# Patient Record
Sex: MALE | Race: WHITE | Employment: OTHER | URBAN - METROPOLITAN AREA
[De-identification: names, ages, dates, MRNs, and addresses within clinical notes are randomized per-mention and may not be internally consistent; named-entity substitution may affect disease eponyms.]

---

## 2022-01-01 ENCOUNTER — HOSPITAL ENCOUNTER (INPATIENT)
Age: 87
LOS: 7 days | DRG: 951 | End: 2022-07-02
Attending: FAMILY MEDICINE | Admitting: FAMILY MEDICINE
Payer: COMMERCIAL

## 2022-01-01 VITALS
SYSTOLIC BLOOD PRESSURE: 134 MMHG | OXYGEN SATURATION: 94 % | TEMPERATURE: 98 F | HEIGHT: 74 IN | RESPIRATION RATE: 18 BRPM | WEIGHT: 189.3 LBS | HEART RATE: 110 BPM | BODY MASS INDEX: 24.29 KG/M2 | DIASTOLIC BLOOD PRESSURE: 76 MMHG

## 2022-01-01 PROCEDURE — 6360000002 HC RX W HCPCS: Performed by: FAMILY MEDICINE

## 2022-01-01 PROCEDURE — 2500000003 HC RX 250 WO HCPCS: Performed by: FAMILY MEDICINE

## 2022-01-01 PROCEDURE — 1200000000 HC SEMI PRIVATE

## 2022-01-01 PROCEDURE — 94761 N-INVAS EAR/PLS OXIMETRY MLT: CPT

## 2022-01-01 PROCEDURE — 6370000000 HC RX 637 (ALT 250 FOR IP): Performed by: FAMILY MEDICINE

## 2022-01-01 PROCEDURE — 51702 INSERT TEMP BLADDER CATH: CPT

## 2022-01-01 RX ORDER — LORAZEPAM 2 MG/ML
0.5 INJECTION INTRAMUSCULAR
Status: DISCONTINUED | OUTPATIENT
Start: 2022-01-01 | End: 2022-01-01

## 2022-01-01 RX ORDER — GLYCOPYRROLATE 1 MG/5 ML
0.2 SYRINGE (ML) INTRAVENOUS EVERY 4 HOURS PRN
Status: DISCONTINUED | OUTPATIENT
Start: 2022-01-01 | End: 2022-01-01 | Stop reason: HOSPADM

## 2022-01-01 RX ORDER — HALOPERIDOL 5 MG/ML
1 INJECTION INTRAMUSCULAR EVERY 6 HOURS SCHEDULED
Status: DISCONTINUED | OUTPATIENT
Start: 2022-01-01 | End: 2022-01-01

## 2022-01-01 RX ORDER — LORAZEPAM 2 MG/ML
0.5 INJECTION INTRAMUSCULAR 3 TIMES DAILY
Status: DISCONTINUED | OUTPATIENT
Start: 2022-01-01 | End: 2022-01-01 | Stop reason: HOSPADM

## 2022-01-01 RX ORDER — LORAZEPAM 2 MG/ML
1 CONCENTRATE ORAL EVERY 8 HOURS SCHEDULED
Status: DISCONTINUED | OUTPATIENT
Start: 2022-01-01 | End: 2022-01-01

## 2022-01-01 RX ORDER — LORAZEPAM 2 MG/ML
0.5 CONCENTRATE ORAL
Status: DISCONTINUED | OUTPATIENT
Start: 2022-01-01 | End: 2022-01-01

## 2022-01-01 RX ORDER — GLYCOPYRROLATE 0.2 MG/ML
0.2 INJECTION INTRAMUSCULAR; INTRAVENOUS EVERY 4 HOURS PRN
Status: DISCONTINUED | OUTPATIENT
Start: 2022-01-01 | End: 2022-01-01 | Stop reason: ALTCHOICE

## 2022-01-01 RX ORDER — LORAZEPAM 2 MG/ML
1 INJECTION INTRAMUSCULAR 3 TIMES DAILY
Status: DISCONTINUED | OUTPATIENT
Start: 2022-01-01 | End: 2022-01-01

## 2022-01-01 RX ORDER — ACETAMINOPHEN 650 MG/1
650 SUPPOSITORY RECTAL EVERY 4 HOURS PRN
Status: DISCONTINUED | OUTPATIENT
Start: 2022-01-01 | End: 2022-01-01 | Stop reason: HOSPADM

## 2022-01-01 RX ORDER — HALOPERIDOL 5 MG/ML
1 INJECTION INTRAMUSCULAR
Status: DISCONTINUED | OUTPATIENT
Start: 2022-01-01 | End: 2022-01-01 | Stop reason: HOSPADM

## 2022-01-01 RX ADMIN — HYDROMORPHONE HYDROCHLORIDE 0.5 MG: 1 INJECTION, SOLUTION INTRAMUSCULAR; INTRAVENOUS; SUBCUTANEOUS at 12:35

## 2022-01-01 RX ADMIN — Medication 1 MG: at 05:15

## 2022-01-01 RX ADMIN — HYDROMORPHONE HYDROCHLORIDE 0.5 MG: 1 INJECTION, SOLUTION INTRAMUSCULAR; INTRAVENOUS; SUBCUTANEOUS at 16:28

## 2022-01-01 RX ADMIN — HALOPERIDOL LACTATE 1 MG: 5 INJECTION, SOLUTION INTRAMUSCULAR at 19:42

## 2022-01-01 RX ADMIN — HALOPERIDOL LACTATE 1 MG: 5 INJECTION, SOLUTION INTRAMUSCULAR at 12:10

## 2022-01-01 RX ADMIN — HYDROMORPHONE HYDROCHLORIDE 0.5 MG: 1 INJECTION, SOLUTION INTRAMUSCULAR; INTRAVENOUS; SUBCUTANEOUS at 23:36

## 2022-01-01 RX ADMIN — HYDROMORPHONE HYDROCHLORIDE 0.5 MG: 1 INJECTION, SOLUTION INTRAMUSCULAR; INTRAVENOUS; SUBCUTANEOUS at 12:17

## 2022-01-01 RX ADMIN — HYDROMORPHONE HYDROCHLORIDE 0.5 MG: 1 INJECTION, SOLUTION INTRAMUSCULAR; INTRAVENOUS; SUBCUTANEOUS at 05:16

## 2022-01-01 RX ADMIN — HALOPERIDOL LACTATE 1 MG: 5 INJECTION, SOLUTION INTRAMUSCULAR at 05:10

## 2022-01-01 RX ADMIN — HALOPERIDOL LACTATE 1 MG: 5 INJECTION, SOLUTION INTRAMUSCULAR at 09:09

## 2022-01-01 RX ADMIN — HYDROMORPHONE HYDROCHLORIDE 0.5 MG: 1 INJECTION, SOLUTION INTRAMUSCULAR; INTRAVENOUS; SUBCUTANEOUS at 09:58

## 2022-01-01 RX ADMIN — Medication 1 MG: at 09:07

## 2022-01-01 RX ADMIN — HYDROMORPHONE HYDROCHLORIDE 0.5 MG: 1 INJECTION, SOLUTION INTRAMUSCULAR; INTRAVENOUS; SUBCUTANEOUS at 20:02

## 2022-01-01 RX ADMIN — HYDROMORPHONE HYDROCHLORIDE 0.5 MG: 1 INJECTION, SOLUTION INTRAMUSCULAR; INTRAVENOUS; SUBCUTANEOUS at 16:41

## 2022-01-01 RX ADMIN — HALOPERIDOL LACTATE 1 MG: 5 INJECTION, SOLUTION INTRAMUSCULAR at 12:16

## 2022-01-01 RX ADMIN — HALOPERIDOL LACTATE 1 MG: 5 INJECTION, SOLUTION INTRAMUSCULAR at 00:16

## 2022-01-01 RX ADMIN — HALOPERIDOL LACTATE 1 MG: 5 INJECTION, SOLUTION INTRAMUSCULAR at 23:42

## 2022-01-01 RX ADMIN — Medication 1 MG: at 15:01

## 2022-01-01 RX ADMIN — HYDROMORPHONE HYDROCHLORIDE 0.5 MG: 1 INJECTION, SOLUTION INTRAMUSCULAR; INTRAVENOUS; SUBCUTANEOUS at 12:12

## 2022-01-01 RX ADMIN — HYDROMORPHONE HYDROCHLORIDE 0.5 MG: 1 INJECTION, SOLUTION INTRAMUSCULAR; INTRAVENOUS; SUBCUTANEOUS at 05:10

## 2022-01-01 RX ADMIN — Medication 1 MG: at 22:38

## 2022-01-01 RX ADMIN — HALOPERIDOL LACTATE 1 MG: 5 INJECTION, SOLUTION INTRAMUSCULAR at 00:51

## 2022-01-01 RX ADMIN — HALOPERIDOL LACTATE 1 MG: 5 INJECTION, SOLUTION INTRAMUSCULAR at 23:07

## 2022-01-01 RX ADMIN — HALOPERIDOL LACTATE 1 MG: 5 INJECTION, SOLUTION INTRAMUSCULAR at 15:27

## 2022-01-01 RX ADMIN — HYDROMORPHONE HYDROCHLORIDE 0.5 MG: 1 INJECTION, SOLUTION INTRAMUSCULAR; INTRAVENOUS; SUBCUTANEOUS at 23:57

## 2022-01-01 RX ADMIN — LORAZEPAM 0.5 MG: 2 INJECTION INTRAMUSCULAR; INTRAVENOUS at 18:36

## 2022-01-01 RX ADMIN — HYDROMORPHONE HYDROCHLORIDE 0.5 MG: 1 INJECTION, SOLUTION INTRAMUSCULAR; INTRAVENOUS; SUBCUTANEOUS at 12:52

## 2022-01-01 RX ADMIN — HYDROMORPHONE HYDROCHLORIDE 0.5 MG: 1 INJECTION, SOLUTION INTRAMUSCULAR; INTRAVENOUS; SUBCUTANEOUS at 05:14

## 2022-01-01 RX ADMIN — HYDROMORPHONE HYDROCHLORIDE 0.5 MG: 1 INJECTION, SOLUTION INTRAMUSCULAR; INTRAVENOUS; SUBCUTANEOUS at 11:44

## 2022-01-01 RX ADMIN — HALOPERIDOL LACTATE 1 MG: 5 INJECTION, SOLUTION INTRAMUSCULAR at 00:10

## 2022-01-01 RX ADMIN — HYDROMORPHONE HYDROCHLORIDE 0.5 MG: 1 INJECTION, SOLUTION INTRAMUSCULAR; INTRAVENOUS; SUBCUTANEOUS at 08:24

## 2022-01-01 RX ADMIN — HYDROMORPHONE HYDROCHLORIDE 0.5 MG: 1 INJECTION, SOLUTION INTRAMUSCULAR; INTRAVENOUS; SUBCUTANEOUS at 04:19

## 2022-01-01 RX ADMIN — HALOPERIDOL LACTATE 1 MG: 5 INJECTION, SOLUTION INTRAMUSCULAR at 20:15

## 2022-01-01 RX ADMIN — HYDROMORPHONE HYDROCHLORIDE 0.5 MG: 1 INJECTION, SOLUTION INTRAMUSCULAR; INTRAVENOUS; SUBCUTANEOUS at 20:14

## 2022-01-01 RX ADMIN — HYDROMORPHONE HYDROCHLORIDE 0.5 MG: 1 INJECTION, SOLUTION INTRAMUSCULAR; INTRAVENOUS; SUBCUTANEOUS at 23:07

## 2022-01-01 RX ADMIN — HALOPERIDOL LACTATE 1 MG: 5 INJECTION, SOLUTION INTRAMUSCULAR at 23:58

## 2022-01-01 RX ADMIN — LORAZEPAM 0.5 MG: 2 INJECTION, SOLUTION INTRAMUSCULAR; INTRAVENOUS at 14:11

## 2022-01-01 RX ADMIN — HALOPERIDOL LACTATE 1 MG: 5 INJECTION, SOLUTION INTRAMUSCULAR at 20:02

## 2022-01-01 RX ADMIN — HYDROMORPHONE HYDROCHLORIDE 0.5 MG: 1 INJECTION, SOLUTION INTRAMUSCULAR; INTRAVENOUS; SUBCUTANEOUS at 08:15

## 2022-01-01 RX ADMIN — HALOPERIDOL LACTATE 1 MG: 5 INJECTION, SOLUTION INTRAMUSCULAR at 06:06

## 2022-01-01 RX ADMIN — HYDROMORPHONE HYDROCHLORIDE 0.5 MG: 1 INJECTION, SOLUTION INTRAMUSCULAR; INTRAVENOUS; SUBCUTANEOUS at 03:52

## 2022-01-01 RX ADMIN — HALOPERIDOL LACTATE 1 MG: 5 INJECTION, SOLUTION INTRAMUSCULAR at 11:44

## 2022-01-01 RX ADMIN — HYDROMORPHONE HYDROCHLORIDE 0.5 MG: 1 INJECTION, SOLUTION INTRAMUSCULAR; INTRAVENOUS; SUBCUTANEOUS at 08:37

## 2022-01-01 RX ADMIN — HALOPERIDOL LACTATE 1 MG: 5 INJECTION, SOLUTION INTRAMUSCULAR at 16:40

## 2022-01-01 RX ADMIN — HALOPERIDOL LACTATE 1 MG: 5 INJECTION, SOLUTION INTRAMUSCULAR at 18:09

## 2022-01-01 RX ADMIN — HYDROMORPHONE HYDROCHLORIDE 0.5 MG: 1 INJECTION, SOLUTION INTRAMUSCULAR; INTRAVENOUS; SUBCUTANEOUS at 00:51

## 2022-01-01 RX ADMIN — HYDROMORPHONE HYDROCHLORIDE 0.5 MG: 1 INJECTION, SOLUTION INTRAMUSCULAR; INTRAVENOUS; SUBCUTANEOUS at 07:50

## 2022-01-01 RX ADMIN — HYDROMORPHONE HYDROCHLORIDE 0.5 MG: 1 INJECTION, SOLUTION INTRAMUSCULAR; INTRAVENOUS; SUBCUTANEOUS at 13:07

## 2022-01-01 RX ADMIN — HALOPERIDOL LACTATE 1 MG: 5 INJECTION, SOLUTION INTRAMUSCULAR at 04:40

## 2022-01-01 RX ADMIN — HYDROMORPHONE HYDROCHLORIDE 1 MG: 1 INJECTION, SOLUTION INTRAMUSCULAR; INTRAVENOUS; SUBCUTANEOUS at 22:03

## 2022-01-01 RX ADMIN — LORAZEPAM 0.5 MG: 2 INJECTION, SOLUTION INTRAMUSCULAR; INTRAVENOUS at 14:55

## 2022-01-01 RX ADMIN — LORAZEPAM 0.5 MG: 2 INJECTION INTRAMUSCULAR; INTRAVENOUS at 00:52

## 2022-01-01 RX ADMIN — HYDROMORPHONE HYDROCHLORIDE 0.5 MG: 1 INJECTION, SOLUTION INTRAMUSCULAR; INTRAVENOUS; SUBCUTANEOUS at 17:09

## 2022-01-01 RX ADMIN — HALOPERIDOL LACTATE 1 MG: 5 INJECTION, SOLUTION INTRAMUSCULAR at 05:45

## 2022-01-01 RX ADMIN — HYDROMORPHONE HYDROCHLORIDE 1 MG: 1 INJECTION, SOLUTION INTRAMUSCULAR; INTRAVENOUS; SUBCUTANEOUS at 02:12

## 2022-01-01 RX ADMIN — HALOPERIDOL LACTATE 1 MG: 5 INJECTION, SOLUTION INTRAMUSCULAR at 16:41

## 2022-01-01 RX ADMIN — LORAZEPAM 0.5 MG: 2 INJECTION, SOLUTION INTRAMUSCULAR; INTRAVENOUS at 23:08

## 2022-01-01 RX ADMIN — HYDROMORPHONE HYDROCHLORIDE 0.5 MG: 1 INJECTION, SOLUTION INTRAMUSCULAR; INTRAVENOUS; SUBCUTANEOUS at 19:41

## 2022-01-01 RX ADMIN — HALOPERIDOL LACTATE 1 MG: 5 INJECTION, SOLUTION INTRAMUSCULAR at 08:14

## 2022-01-01 RX ADMIN — HALOPERIDOL LACTATE 1 MG: 5 INJECTION, SOLUTION INTRAMUSCULAR at 04:19

## 2022-01-01 RX ADMIN — HALOPERIDOL LACTATE 1 MG: 5 INJECTION, SOLUTION INTRAMUSCULAR at 21:44

## 2022-01-01 RX ADMIN — GLYCOPYRROLATE 0.2 MG: 1 INJECTION INTRAMUSCULAR; INTRAVENOUS at 06:48

## 2022-01-01 RX ADMIN — HYDROMORPHONE HYDROCHLORIDE 0.5 MG: 1 INJECTION, SOLUTION INTRAMUSCULAR; INTRAVENOUS; SUBCUTANEOUS at 16:40

## 2022-01-01 RX ADMIN — HYDROMORPHONE HYDROCHLORIDE 0.5 MG: 1 INJECTION, SOLUTION INTRAMUSCULAR; INTRAVENOUS; SUBCUTANEOUS at 00:52

## 2022-01-01 RX ADMIN — HALOPERIDOL LACTATE 1 MG: 5 INJECTION, SOLUTION INTRAMUSCULAR at 16:58

## 2022-01-01 RX ADMIN — HALOPERIDOL LACTATE 1 MG: 5 INJECTION, SOLUTION INTRAMUSCULAR at 05:14

## 2022-01-01 RX ADMIN — HALOPERIDOL LACTATE 1 MG: 5 INJECTION, SOLUTION INTRAMUSCULAR at 08:44

## 2022-01-01 RX ADMIN — LORAZEPAM 0.5 MG: 2 INJECTION, SOLUTION INTRAMUSCULAR; INTRAVENOUS at 09:09

## 2022-01-01 RX ADMIN — HYDROMORPHONE HYDROCHLORIDE 0.5 MG: 1 INJECTION, SOLUTION INTRAMUSCULAR; INTRAVENOUS; SUBCUTANEOUS at 04:40

## 2022-01-01 RX ADMIN — HYDROMORPHONE HYDROCHLORIDE 0.5 MG: 1 INJECTION, SOLUTION INTRAMUSCULAR; INTRAVENOUS; SUBCUTANEOUS at 00:08

## 2022-01-01 RX ADMIN — HYDROMORPHONE HYDROCHLORIDE 0.5 MG: 1 INJECTION, SOLUTION INTRAMUSCULAR; INTRAVENOUS; SUBCUTANEOUS at 16:31

## 2022-01-01 RX ADMIN — HYDROMORPHONE HYDROCHLORIDE 0.5 MG: 1 INJECTION, SOLUTION INTRAMUSCULAR; INTRAVENOUS; SUBCUTANEOUS at 04:06

## 2022-01-01 RX ADMIN — HYDROMORPHONE HYDROCHLORIDE 1 MG: 1 INJECTION, SOLUTION INTRAMUSCULAR; INTRAVENOUS; SUBCUTANEOUS at 14:07

## 2022-01-01 RX ADMIN — Medication 0.5 MG: at 13:13

## 2022-01-01 RX ADMIN — HYDROMORPHONE HYDROCHLORIDE 0.5 MG: 1 INJECTION, SOLUTION INTRAMUSCULAR; INTRAVENOUS; SUBCUTANEOUS at 08:41

## 2022-01-01 RX ADMIN — LORAZEPAM 0.5 MG: 2 INJECTION, SOLUTION INTRAMUSCULAR; INTRAVENOUS at 08:14

## 2022-01-01 RX ADMIN — HALOPERIDOL LACTATE 1 MG: 5 INJECTION, SOLUTION INTRAMUSCULAR at 03:51

## 2022-01-01 RX ADMIN — HALOPERIDOL LACTATE 1 MG: 5 INJECTION, SOLUTION INTRAMUSCULAR at 12:35

## 2022-01-01 RX ADMIN — HYDROMORPHONE HYDROCHLORIDE 0.5 MG: 1 INJECTION, SOLUTION INTRAMUSCULAR; INTRAVENOUS; SUBCUTANEOUS at 16:59

## 2022-01-01 RX ADMIN — LORAZEPAM 0.5 MG: 2 INJECTION INTRAMUSCULAR; INTRAVENOUS at 10:33

## 2022-01-01 RX ADMIN — HYDROMORPHONE HYDROCHLORIDE 0.5 MG: 1 INJECTION, SOLUTION INTRAMUSCULAR; INTRAVENOUS; SUBCUTANEOUS at 00:15

## 2022-01-01 RX ADMIN — HALOPERIDOL LACTATE 1 MG: 5 INJECTION, SOLUTION INTRAMUSCULAR at 16:31

## 2022-01-01 RX ADMIN — HYDROMORPHONE HYDROCHLORIDE 0.5 MG: 1 INJECTION, SOLUTION INTRAMUSCULAR; INTRAVENOUS; SUBCUTANEOUS at 21:44

## 2022-01-01 RX ADMIN — HALOPERIDOL LACTATE 1 MG: 5 INJECTION, SOLUTION INTRAMUSCULAR at 14:07

## 2022-01-01 RX ADMIN — HALOPERIDOL LACTATE 1 MG: 5 INJECTION, SOLUTION INTRAMUSCULAR at 12:53

## 2022-01-01 RX ADMIN — HALOPERIDOL LACTATE 1 MG: 5 INJECTION, SOLUTION INTRAMUSCULAR at 00:52

## 2022-01-01 ASSESSMENT — PAIN SCALES - WONG BAKER
WONGBAKER_NUMERICALRESPONSE: 0
WONGBAKER_NUMERICALRESPONSE: 4
WONGBAKER_NUMERICALRESPONSE: 2
WONGBAKER_NUMERICALRESPONSE: 0
WONGBAKER_NUMERICALRESPONSE: 2
WONGBAKER_NUMERICALRESPONSE: 4
WONGBAKER_NUMERICALRESPONSE: 0
WONGBAKER_NUMERICALRESPONSE: 8
WONGBAKER_NUMERICALRESPONSE: 0
WONGBAKER_NUMERICALRESPONSE: 2
WONGBAKER_NUMERICALRESPONSE: 0
WONGBAKER_NUMERICALRESPONSE: 2
WONGBAKER_NUMERICALRESPONSE: 0
WONGBAKER_NUMERICALRESPONSE: 4
WONGBAKER_NUMERICALRESPONSE: 2
WONGBAKER_NUMERICALRESPONSE: 0
WONGBAKER_NUMERICALRESPONSE: 8
WONGBAKER_NUMERICALRESPONSE: 0
WONGBAKER_NUMERICALRESPONSE: 0
WONGBAKER_NUMERICALRESPONSE: 8
WONGBAKER_NUMERICALRESPONSE: 0
WONGBAKER_NUMERICALRESPONSE: 8
WONGBAKER_NUMERICALRESPONSE: 0
WONGBAKER_NUMERICALRESPONSE: 4
WONGBAKER_NUMERICALRESPONSE: 10
WONGBAKER_NUMERICALRESPONSE: 0
WONGBAKER_NUMERICALRESPONSE: 2
WONGBAKER_NUMERICALRESPONSE: 0
WONGBAKER_NUMERICALRESPONSE: 4
WONGBAKER_NUMERICALRESPONSE: 8
WONGBAKER_NUMERICALRESPONSE: 0
WONGBAKER_NUMERICALRESPONSE: 4
WONGBAKER_NUMERICALRESPONSE: 0
WONGBAKER_NUMERICALRESPONSE: 2
WONGBAKER_NUMERICALRESPONSE: 0
WONGBAKER_NUMERICALRESPONSE: 0
WONGBAKER_NUMERICALRESPONSE: 4
WONGBAKER_NUMERICALRESPONSE: 0
WONGBAKER_NUMERICALRESPONSE: 0
WONGBAKER_NUMERICALRESPONSE: 6
WONGBAKER_NUMERICALRESPONSE: 8

## 2022-01-01 ASSESSMENT — PAIN - FUNCTIONAL ASSESSMENT
PAIN_FUNCTIONAL_ASSESSMENT: ACTIVITIES ARE NOT PREVENTED

## 2022-01-01 ASSESSMENT — PAIN DESCRIPTION - ORIENTATION
ORIENTATION: RIGHT

## 2022-01-01 ASSESSMENT — PAIN DESCRIPTION - PAIN TYPE
TYPE: ACUTE PAIN

## 2022-01-01 ASSESSMENT — PAIN DESCRIPTION - DESCRIPTORS
DESCRIPTORS: PATIENT UNABLE TO DESCRIBE
DESCRIPTORS: ACHING
DESCRIPTORS: PATIENT UNABLE TO DESCRIBE
DESCRIPTORS: PATIENT UNABLE TO DESCRIBE
DESCRIPTORS: ACHING

## 2022-01-01 ASSESSMENT — PAIN DESCRIPTION - LOCATION
LOCATION: SHOULDER

## 2022-01-01 ASSESSMENT — PAIN SCALES - GENERAL
PAINLEVEL_OUTOF10: 10
PAINLEVEL_OUTOF10: 7
PAINLEVEL_OUTOF10: 4

## 2022-06-25 PROBLEM — A41.9 SEPSIS (HCC): Status: ACTIVE | Noted: 2022-01-01

## 2022-06-26 PROBLEM — J96.20 ACUTE AND CHRONIC RESPIRATORY FAILURE, UNSPECIFIED WHETHER WITH HYPOXIA OR HYPERCAPNIA (HCC): Status: ACTIVE | Noted: 2022-01-01

## 2022-06-26 NOTE — PROGRESS NOTES
4 Eyes Skin Assessment     NAME:  Maria Victoria Negrete. YOB: 1928  MEDICAL RECORD NUMBER:  [id number]    The patient is being assess for  Admission    I agree that 2 RN's have performed a thorough Head to Toe Skin Assessment on the patient. ALL assessment sites listed below have been assessed. Areas assessed by both nurses:    Head, Face, Ears, Shoulders, Back, Chest, Arms, Elbows, Hands, Sacrum. Buttock, Coccyx, Ischium and Legs. Feet and Heels        Does the Patient have a Wound? Yes wound(s) were present on assessment.  LDA wound assessment was Initiated and completed        Hector Prevention initiated:  Yes   Wound Care Orders initiated:  NA    Pressure Injury (Stage 3,4, Unstageable, DTI, NWPT, and Complex wounds) if present place consult order under [de-identified] No     New and Established Ostomies if present place consult order under : No      Nurse 1 eSignature: Electronically signed by Emerson Randhawa RN on 6/25/22 at 10:58 PM EDT    **SHARE this note so that the co-signing nurse is able to place an eSignature**    Nurse 2 eSignature: Electronically signed by Paola Perkins RN on 6/25/22 at 11:48 PM EDT

## 2022-06-26 NOTE — H&P
[address] [place] [address]  General Inpatient History and Physical      Date: 6/26/2022  Name: Umm Dunaway. MRN: [id number]  YOB: 1928  Admit Date: 6/25/2022  Primary Care: No primary care provider on file. Informant: old records are reviewed. There are no family here. Records are obtained from recent stay at [facility] and reviewed    Chief Complaint:  Unable to self report    History of Present Illness: Umm Castorena is a 80 y.o. male, who is admitted to [address] [place] [address] at [facility]  for management of pain and agitation . Background:      81 yo male transferred to [facility] from [facility] [place] for [place] of life care. The patient was admitted at [facility] from an ECF  with sepsis, developed respiratory failure and was intubated and ventilated. He was doing very poorly and he was extubated 6/17/2022 with very poor prognosis. He was on vasopressors which have been stopped . Family requested transfer so that they could be closer to home. 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 However, she is not here currently. History reviewed. No pertinent past medical history. Past Surgical History   has no past surgical history on file.     Social History:   Social History     Socioeconomic History    Marital status: Single     Spouse name: None    Number of children: None    Years of education: None    Highest education level: None   Occupational History    None   Tobacco Use    Smoking status: Never Smoker    Smokeless tobacco: Never Used   Vaping Use    Vaping Use: Never used   Substance and Sexual Activity    Alcohol use: Not Currently     Alcohol/week: 7.0 standard drinks     Types: 7 Glasses of wine per week    Drug use: Never    Sexual activity: None   Other Topics Concern    None   Social History Narrative    None 91%   BMI 24.30 kg/m²   General: appears to be sleeping but arouses easily to voice and startles, does not make eye contact, when asked about pain he holds his hand up ti the left ear area  HEENT: Mouth is open , mm are dry, eyes are closed, but opens them to voice and sclera is clear; thickening and tenderness in the left parotid area    Heart: distant  ,RRR, S1S2, no murmurs noted  Lungs:  Distant,bilaterally, diminished at the bases  Abdomen[de-identified] no BS ausculted, very full,  but soft ,  no tenderness  Extremities:  Warm , deconditioned, + edema LE, tears and bruises noted   Neurologic: lethargic, arouses to voice, does not respond to questions verbally but makes gestures acknowledging understanding of my questions        Data: reviewed     Assesment/Plan:    1. 79 yo male transferred to [facility] from [facility] in [place] for end of life care. The patient was admitted at [facility] with sepsis, developed respiratory failure and was intubated and ventilated. He was doing very poorly and he was extubated 6/17/2022 with very poor prognosis. He was on vasopressors which have been stopped . Family requested transfer so that they could be closer to home   2. Patient has had 4 doses of hydromorphone in 24 hours and appears painful but may not be able to self report- will schedule RTC hdromorphone and increase breakthrough pain medication dosing   3. The patient is admitted to [facility] for acute management of  Pain, respiratory failure, heart failure, end of life care   4.  DVT prophylaxis: none indicated due to Hospice/end of life care    Patient Active Problem List   Diagnosis Code    Sepsis ([facility] A41.9    Acute and chronic respiratory failure, unspecified whether with hypoxia or hypercapnia ([facility] J96.20        Electronically signed by Ash Raymond DO on 6/26/2022 at 1:42 PM

## 2022-06-27 PROBLEM — Z51.5 HOSPICE CARE: Status: ACTIVE | Noted: 2022-01-01

## 2022-06-27 NOTE — PROGRESS NOTES
[address] [place] [address]  General Inpatient Hospice Progress Note    Date: 6/27/2022  Name: Dash Ballard. MRN: [id number]  YOB: 1928   Patient's PCP: No primary care provider on file. Acute care admission at [facility]: 6/11 to 6/25/22  Mechanical ventilation: 6/11 to 6/15 and emergently re intubated and extubated 6/17/22  Admit Date: 6/25/2022 to General Inpatient Hospice      Subjective: The patient is minimally responsive, open mouth breathing, restless with exam and facial grimacing. The prn dose of Haloperidol was increased yesterday. He is chronically ill appearing. There are no family here. [facility] on 6/27/22. Objective:   Background: 79 yo male with history of dementia, bioprosthetic aortic valve, atrial fibrillation, hypertension, who was transferred to [place] [address] from [facility] in [place] for end of life care. The patient was admitted at [facility] from an ECF with sepsis, suspected left parotitis, E.coli UTI, developed respiratory failure and was intubated and ventilated, + MSSA bacteremia. He was doing very poorly and he was extubated 6/17/2022 with very poor prognosis. He was on vasopressors which have been stopped 6/24 after goals of care meeting with family relating poor prognosis. His family reports declining health in the past several months. Family requested hospice care and transfer to [facility] to be closer to home. The patient has a history of chronic mastoiditis. he patient's primary caregiver is a granddaughter, Matilde Brown. Pain is managed with Hydromorphone IV 0.5 mg scheduled every 6 hours plus prn with 5 prn doses in the past 24 hours, Glycopyrrolate IV is available for secretions, and Lorazepam is available for anxiety. Data from [facility] reviewed 6/27/2022:  CT Neck 6/11/22:  1.  Asymmetric enlargement the left parotid gland, consider parotitis. 2.  Infiltrative changes superficial and deep to the distal muscle and in the facial subcutaneous soft tissues on the left may be due to cellulitis. 3.  Area of hypoattenuation in the left auricular soft tissues measuring 0.7 cm without loculation. May be due to phlegmon. CT-scan of the brain 6/11/22:  1.  No acute intracranial bleed, midline shift, mass effect or extra-axial collection. 2.  Loss of volume in the cerebrum and cerebellum with chronic microvascular changes.     3.  Limited views of the left parotid gland with asymmetric enlargement and some infiltrative changes of the imaged portions of the left facial subcutaneous soft tissues. May be due to parotitis. 4.  Left mastoid airspace disease. CT Chest 6/11/22:  1.  Dependent consolidative changes and few parenchymal bands in the lungs may reflect atelectasis. 2.  Endotracheal tube terminates above the bari. Orogastric tube terminates in the stomach lumen. 3.  Nonspecific fluid-filled segments of small bowel and large bowel without dilatation could be due to diarrhea or a mild enterocolitis. 4.  Ancillary findings discussed above. Transthoracic Echocardiogram 6/13/22:  1. Left ventricle: The cavity size was at the lower limits of      normal. There was mild concentric hypertrophy. Systolic function      was hyperdynamic. The estimated ejection fraction was in the      range of 65% to 70%. Wall motion was normal; there were no      regional wall motion abnormalities. 2. Aortic valve: A bioprosthesis (#23 pericardial bioprosthetic      valve implanted December 2013) was present. 3. Mitral valve: The annulus was moderately to markedly calcified.      The leaflets were moderately thickened. 4. Left atrium: The atrium was moderately dilated. 5. Right ventricle:  The cavity size was normal. Wall thickness was      normal. Systolic function was normal.   6. Right atrium: The atrium was moderately dilated. 7. Pulmonary arteries: Systolic pressure was mildly increased.      Estimated PA peak pressure is 43mm Hg (S). 8. Pericardium, extracardiac: There was no pericardial effusion. Impressions:  Suboptimal exam for excluding vegetations. Valve   structures are not well visualized. Consider CHITO if clinically   indicated. Physical Exam:   BP (!) 114/45   Pulse (!) 141   Temp 97.7 °F (36.5 °C) (Axillary)   Resp 20   Ht 6' 2\" (1.88 m)   Wt 189 lb 4.8 oz (85.9 kg)   SpO2 91%   BMI 24.30 kg/m²   General: restless with exam, + facial grimacing and soft moan with exam, no speech,   Skin: sallow, decreased subcutaneous fat  Neck: carotid upstrokes are diminished without bruit  HEENT: Mucous membranes are dry, open mouth breathing, sclerae are clear, there is some fullness and induration in the left upper neck and submandibular area  Neck: carotid upstrokes are diminished without bruit  Chest: healed sternotomy scar   Heart: distant tones, tachycardic IRRR, S1S2, soft II/VI KIKI at left sternal border  Lungs:  Distant breath sounds bilaterally, diminished at the bases, without rales, scattered rhonchi   Abdomen: soft, bowel sounds hypoactive, no apparent tenderness, nondistended  : undergarment in place  Extremities:  No mottling, + trace edema in bilateral lower extremities,   Neurologic: lethargic, moans with exam, no speech, moves extremities spontaneously but not to command    Assessment/Plan:     1. Sepsis, MSSA bacteremia from left parotitis with septic shock and respiratory failure and removal of mechanical ventilation 6/17, and vasopressors 6/24/22. The patient did not improve with aggressive medical care. [address] for management of pain, dyspnea, delirium, probable end of life care. PPS 20% and prognosis is grim. Adjust scheduled Hydromorphone to every 4 hours and continue higher dose of prn.  Adjust scheduled Haloperidol to every 4 hours and continue prn. Add scheduled Lorazepam.  2. History of bioprosthetic aortic valve  3. Atrial fibrillation  4. DNR-comfort care      Patient Active Problem List   Diagnosis Code    Sepsis ([facility] A41.9    Acute and chronic respiratory failure, unspecified whether with hypoxia or hypercapnia ([facility] J96.20    Hospice care Z51.5       STELLA Welch MD  6/27/2022

## 2022-06-27 NOTE — PROGRESS NOTES
Nutrition Note    Patient screened positive for unintended weight loss and decreased appetite. Patient has been admitted to [facility] on 6/25. Currently on No diet orders on file. May add supplement as pt/family request. Nutrition will have little or no benefit for overall prognosis but provides energy. RD will be available as requested/consulted.      Electronically signed by Levy Abel RD, JUSTIN on 6/27/2022 at 10:27 AM   Contact: [phone]

## 2022-06-28 NOTE — PROGRESS NOTES
[address] [place] [address]  General Inpatient Hospice Progress Note    Date: 6/28/2022  Name: Leonel Stauffer. MRN: [id number]  YOB: 1928   Patient's PCP: No primary care provider on file. Acute care admission at [facility]: 6/11 to 6/25/22  Mechanical ventilation: 6/11 to 6/15 and emergently re intubated and extubated 6/17/22  Admit Date: 6/25/2022 to General Inpatient Hospice      Subjective: The patient is minimally responsive, yells out with care giving, with open mouth breathing. He is restless with exam, no facial grimacing. The scheduled dose of Haloperidol was increased 6/27/22. He is chronically ill appearing. There are no family here. I collaborated with the patient's nurse and the hospice nurse. [facility] on 6/27/22. Objective:   Background: 79 yo male with history of dementia, bioprosthetic aortic valve, atrial fibrillation, hypertension, who was transferred to [place] [address] from [facility] in [place] for end of life care. The patient was admitted at [facility] from an ECF with sepsis, suspected left parotitis, E.coli UTI, developed respiratory failure and was intubated and ventilated, + MSSA bacteremia. He was doing very poorly and he was extubated 6/17/2022 with very poor prognosis. He was on vasopressors which have been stopped 6/24 after goals of care meeting with family relating poor prognosis. His family reports declining health in the past several months. Family requested hospice care and transfer to [place] [address] to be closer to home. The patient has a history of chronic mastoiditis. he patient's primary caregiver is a granddaughter, Javi Santa.       Pain is managed with Hydromorphone IV 0.5 mg scheduled every 4 hours plus prn with 0 prn doses in the past 24 hours, Glycopyrrolate IV is available for secretions, and Lorazepam is available for anxiety. Data from [facility] reviewed 6/28/2022:  CT Neck 6/11/22:  1.  Asymmetric enlargement the left parotid gland, consider parotitis. 2.  Infiltrative changes superficial and deep to the distal muscle and in the facial subcutaneous soft tissues on the left may be due to cellulitis. 3.  Area of hypoattenuation in the left auricular soft tissues measuring 0.7 cm without loculation. May be due to phlegmon. CT-scan of the brain 6/11/22:  1.  No acute intracranial bleed, midline shift, mass effect or extra-axial collection. 2.  Loss of volume in the cerebrum and cerebellum with chronic microvascular changes.     3.  Limited views of the left parotid gland with asymmetric enlargement and some infiltrative changes of the imaged portions of the left facial subcutaneous soft tissues. May be due to parotitis. 4.  Left mastoid airspace disease. CT Chest 6/11/22:  1.  Dependent consolidative changes and few parenchymal bands in the lungs may reflect atelectasis. 2.  Endotracheal tube terminates above the bari. Orogastric tube terminates in the stomach lumen. 3.  Nonspecific fluid-filled segments of small bowel and large bowel without dilatation could be due to diarrhea or a mild enterocolitis. 4.  Ancillary findings discussed above. Transthoracic Echocardiogram 6/13/22:  1. Left ventricle: The cavity size was at the lower limits of      normal. There was mild concentric hypertrophy. Systolic function      was hyperdynamic. The estimated ejection fraction was in the      range of 65% to 70%. Wall motion was normal; there were no      regional wall motion abnormalities. 2. Aortic valve: A bioprosthesis (#23 pericardial bioprosthetic      valve implanted December 2013) was present. 3. Mitral valve: The annulus was moderately to markedly calcified.      The leaflets were moderately thickened. 4. Left atrium: The atrium was moderately dilated.    5. Right ventricle: The cavity size was normal. Wall thickness was      normal. Systolic function was normal.   6. Right atrium: The atrium was moderately dilated. 7. Pulmonary arteries: Systolic pressure was mildly increased.      Estimated PA peak pressure is 43mm Hg (S). 8. Pericardium, extracardiac: There was no pericardial effusion. Impressions:  Suboptimal exam for excluding vegetations. Valve   structures are not well visualized. Consider CHITO if clinically   indicated. Physical Exam:   BP (!) 167/104   Pulse (!) 109   Temp 98.4 °F (36.9 °C) (Axillary)   Resp 22   Ht 6' 2\" (1.88 m)   Wt 189 lb 4.8 oz (85.9 kg)   SpO2 94%   BMI 24.30 kg/m²   General: restless with exam, no facial grimacing, cries out with care giving and exam then settles down, no speech,   Skin: sallow, scattered bruising  Neck: carotid upstrokes are diminished without bruit  HEENT: Mucous membranes are dry, open mouth breathing, there is some fullness and induration in the left upper neck and submandibular area  Chest: healed sternotomy scar   Heart: distant tones, tachycardic IRRR, S1S2, soft II/VI KIKI at left sternal border  Lungs:  Distant breath sounds bilaterally, diminished at the bases, without rales, scattered rhonchi   Abdomen: soft, bowel sounds hypoactive, no apparent tenderness, nondistended  : undergarment in place  Extremities:  + mottling, feet are cool, trace edema in bilateral lower extremities,   Neurologic: minimally responsive, moans with exam, no speech, moves extremities spontaneously but not to command    Assessment/Plan:     1. Sepsis, MSSA bacteremia from left parotitis with septic shock and respiratory failure and removal of mechanical ventilation 6/17, and vasopressors 6/24/22. The patient did not improve with aggressive medical care. [address] for management of pain, dyspnea, delirium, probable end of life care. PPS 20% and prognosis is grim.  Continue scheduled Hydromorphone every 4 hours and continue higher dose of prn. Continue scheduled Haloperidol every 4 hours and continue prn. Will add scheduled Lorazepam.  2. History of bioprosthetic aortic valve and prior echocardiogram did not suggest endocarditis  3. Atrial fibrillation  4. DNR-comfort care      Patient Active Problem List   Diagnosis Code    Sepsis ([facility] A41.9    Acute and chronic respiratory failure, unspecified whether with hypoxia or hypercapnia ([facility] J96.20    Hospice care Z51.5       STELLA Fisher MD  6/28/2022

## 2022-06-28 NOTE — PLAN OF CARE
Problem: Discharge Planning  Goal: Discharge to home or other facility with appropriate resources  Outcome: Progressing     Problem: Pain  Goal: Verbalizes/displays adequate comfort level or baseline comfort level  Outcome: Progressing     Problem: Skin/Tissue Integrity  Goal: Absence of new skin breakdown  Description: 1. Monitor for areas of redness and/or skin breakdown  2. Assess vascular access sites hourly  3. Every 4-6 hours minimum:  Change oxygen saturation probe site  4. Every 4-6 hours:  If on nasal continuous positive airway pressure, respiratory therapy assess nares and determine need for appliance change or resting period. Outcome: Progressing     Problem: Safety - Adult  Goal: Free from fall injury  Outcome: Progressing     Problem: ABCDS Injury Assessment  Goal: Absence of physical injury  Outcome: Progressing     Problem: Confusion  Goal: Confusion, delirium, dementia, or psychosis is improved or at baseline  Description: INTERVENTIONS:  1. Assess for possible contributors to thought disturbance, including medications, impaired vision or hearing, underlying metabolic abnormalities, dehydration, psychiatric diagnoses, and notify attending LIP  2. [place] high risk fall precautions, as indicated  3. Provide frequent short contacts to provide reality reorientation, refocusing and direction  4. Decrease environmental stimuli, including noise as appropriate  5. Monitor and intervene to maintain adequate nutrition, hydration, elimination, sleep and activity  6. If unable to ensure safety without constant attention obtain sitter and review sitter guidelines with assigned personnel  7.  Initiate Psychosocial CNS and Spiritual Care consult, as indicated  Outcome: Progressing     Problem: Discharge Planning  Goal: Discharge to home or other facility with appropriate resources  Outcome: Progressing     Problem: Pain  Goal: Verbalizes/displays adequate comfort level or baseline comfort level  Outcome: Progressing     Problem: Skin/Tissue Integrity  Goal: Absence of new skin breakdown  Description: 1. Monitor for areas of redness and/or skin breakdown  2. Assess vascular access sites hourly  3. Every 4-6 hours minimum:  Change oxygen saturation probe site  4. Every 4-6 hours:  If on nasal continuous positive airway pressure, respiratory therapy assess nares and determine need for appliance change or resting period. Outcome: Progressing     Problem: Safety - Adult  Goal: Free from fall injury  Outcome: Progressing     Problem: ABCDS Injury Assessment  Goal: Absence of physical injury  Outcome: Progressing     Problem: Confusion  Goal: Confusion, delirium, dementia, or psychosis is improved or at baseline  Description: INTERVENTIONS:  1. Assess for possible contributors to thought disturbance, including medications, impaired vision or hearing, underlying metabolic abnormalities, dehydration, psychiatric diagnoses, and notify attending LIP  2. [place] high risk fall precautions, as indicated  3. Provide frequent short contacts to provide reality reorientation, refocusing and direction  4. Decrease environmental stimuli, including noise as appropriate  5. Monitor and intervene to maintain adequate nutrition, hydration, elimination, sleep and activity  6. If unable to ensure safety without constant attention obtain sitter and review sitter guidelines with assigned personnel  7.  Initiate Psychosocial CNS and Spiritual Care consult, as indicated  Outcome: Progressing

## 2022-06-29 NOTE — PROGRESS NOTES
[address] [place] [address]  General Inpatient Hospice Progress Note    Date: 6/29/2022  Name: Carloz Caicedo. MRN: [id number]  YOB: 1928   Patient's PCP: No primary care provider on file. Acute care admission at [facility]: 6/11 to 6/25/22  Mechanical ventilation: 6/11 to 6/15 and emergently re intubated and extubated 6/17/22  Admit Date: 6/25/2022 to General Inpatient Hospice    Subjective: The patient is unresponsive except for moving hands with my exam. eyes are closed. There is some upper airway noise, and occasional moan with exam. He is chronically ill appearing. There are no family here. I have [facility] the [facility] from [facility] on 6/27/22. Objective:   Background: 81 yo male with history of dementia, bioprosthetic aortic valve, atrial fibrillation, hypertension, who was transferred to [place] [address] from [facility] in [facility] for end of life care. The patient was admitted at [facility] from an ECF with sepsis, suspected left parotitis, E.coli UTI, developed respiratory failure and was intubated and ventilated, + MSSA bacteremia. He was doing very poorly and he was extubated 6/17/2022 with very poor prognosis. He was on vasopressors which have been stopped 6/24 after goals of care meeting with family relating poor prognosis. His family reports declining health in the past several months. Family requested hospice care and transfer to [facility] to be closer to home. The patient has a history of chronic mastoiditis. he patient's primary caregiver is a granddaughter, [facility]. Pain is managed with Hydromorphone IV 0.5 mg scheduled every 4 hours plus prn with 0 prn doses in the past 24 hours, Glycopyrrolate IV is available for secretions, Haloperidol is scheduled every 4 hours and prn for delirium, and Lorazepam is available for anxiety.       Data from [facility] [address] [place] [address] reviewed 6/29/2022:  CT Neck 6/11/22:  1.  Asymmetric enlargement the left parotid gland, consider parotitis. 2.  Infiltrative changes superficial and deep to the distal muscle and in the facial subcutaneous soft tissues on the left may be due to cellulitis. 3.  Area of hypoattenuation in the left auricular soft tissues measuring 0.7 cm without loculation. May be due to phlegmon. CT-scan of the brain 6/11/22:  1.  No acute intracranial bleed, midline shift, mass effect or extra-axial collection. 2.  Loss of volume in the cerebrum and cerebellum with chronic microvascular changes.     3.  Limited views of the left parotid gland with asymmetric enlargement and some infiltrative changes of the imaged portions of the left facial subcutaneous soft tissues. May be due to parotitis. 4.  Left mastoid airspace disease. CT Chest 6/11/22:  1.  Dependent consolidative changes and few parenchymal bands in the lungs may reflect atelectasis. 2.  Endotracheal tube terminates above the bari. Orogastric tube terminates in the stomach lumen. 3.  Nonspecific fluid-filled segments of small bowel and large bowel without dilatation could be due to diarrhea or a mild enterocolitis. 4.  Ancillary findings discussed above. Transthoracic Echocardiogram 6/13/22:  1. Left ventricle: The cavity size was at the lower limits of      normal. There was mild concentric hypertrophy. Systolic function      was hyperdynamic. The estimated ejection fraction was in the      range of 65% to 70%. Wall motion was normal; there were no      regional wall motion abnormalities. 2. Aortic valve: A bioprosthesis (#23 pericardial bioprosthetic      valve implanted December 2013) was present. 3. Mitral valve: The annulus was moderately to markedly calcified.      The leaflets were moderately thickened. 4. Left atrium: The atrium was moderately dilated. 5. Right ventricle:  The cavity size was normal. Wall thickness was      normal. Systolic function was normal.   6. Right atrium: The atrium was moderately dilated. 7. Pulmonary arteries: Systolic pressure was mildly increased.      Estimated PA peak pressure is 43mm Hg (S). 8. Pericardium, extracardiac: There was no pericardial effusion. Impressions:  Suboptimal exam for excluding vegetations. Valve   structures are not well visualized. Consider CHITO if clinically   indicated. Physical Exam:   BP (!) 123/90   Pulse 95   Temp 97.6 °F (36.4 °C) (Axillary)   Resp 20   Ht 6' 2\" (1.88 m)   Wt 189 lb 4.8 oz (85.9 kg)   SpO2 95%   BMI 24.30 kg/m²   General: unresponsive, mild upper airway noise, occasional moaning with exam   Skin: sallow, scattered bruising  HEENT: Mucous membranes are dry, open mouth breathing,  Chest: healed sternotomy scar   Heart: distant tones, tachycardic IRRR, S1S2, soft II/VI KIKI at left sternal border  Lungs:  Distant breath sounds bilaterally, diminished at the bases, without rales, scattered rhonchi   Abdomen: soft, bowel sounds hypoactive, no apparent tenderness, nondistended  Extremities:  + mottling, feet are cool, trace edema in bilateral lower extremities,   Neurologic: unresponsive, soft moan with exam, moves extremities spontaneously but not to command    Assessment/Plan:     1. Sepsis, MSSA bacteremia from left parotitis with septic shock and respiratory failure and removal of mechanical ventilation 6/17, and vasopressors 6/24/22. The patient did not improve with aggressive medical care. [address] for management of pain, dyspnea, delirium, probable end of life care. PPS 20% and prognosis is grim. Continue scheduled Hydromorphone every 4 hours and continue higher dose of prn. Continue scheduled Haloperidol every 4 hours and continue prn. Continue scheduled and prn Lorazepam. The patient requires parenteral comfort medications as alternate route is not available.  The patient continues with daily decline, and life expectancy is likely days. The patient requires ongoing [address] [place] [address] for frequent assessment of pain, delirium and symptom management with daily physician visits for medication adjustment. 2. History of bioprosthetic aortic valve and prior echocardiogram did not suggest endocarditis  3. Atrial fibrillation  4. DNR-comfort care      Patient Active Problem List   Diagnosis Code    Sepsis ([facility] A41.9    Acute and chronic respiratory failure, unspecified whether with hypoxia or hypercapnia ([facility] J96.20    Hospice care Z51.5       STELLA Guardado MD  6/29/2022

## 2022-06-30 NOTE — CARE COORDINATION
Representative from [facility] presented to [facility] desk to advised that court appointed guardian. Copy provided/ placed in soft chart. Updated info in emergency contacts. Notified [place] at [facility] as pt is GIP.

## 2022-06-30 NOTE — PROGRESS NOTES
[address] [place] [address]  General Inpatient Hospice Progress Note    Date: 6/30/2022  Name: Joan Cotton. MRN: [id number]  YOB: 1928   Patient's PCP: No primary care provider on file. Acute care admission at [facility]: 6/11 to 6/25/22  Mechanical ventilation: 6/11 to 6/15 and emergently re intubated and extubated 6/17/22  Admit Date: 6/25/2022 to General Inpatient Hospice    Subjective: The patient is unresponsive, eyes are closed, and he moans with my exam which resolves quickly after. There is mild upper airway noise. He is chronically ill appearing. There are no family here. I collaborated with the patient's nurse and the hospice nurse. I have reviewed the [facility] records from [facility] on 6/27/22. Objective:   Background: 79 yo male with history of dementia, bioprosthetic aortic valve, atrial fibrillation, hypertension, who was transferred to [place] [address] from [facility] in [place] for end of life care. The patient was admitted at [facility] from an ECF with sepsis, suspected left parotitis, E.coli UTI, developed respiratory failure and was intubated and ventilated, + MSSA bacteremia. He was doing very poorly and he was extubated 6/17/2022 with very poor prognosis. He was on vasopressors which have been stopped 6/24 after goals of care meeting with family relating poor prognosis. His family reports declining health in the past several months. Family requested hospice care and transfer to [facility] to be closer to home. The patient has a history of chronic mastoiditis. he patient's primary caregiver is a granddaughter, Jacki Simons.       Pain is managed with Hydromorphone IV 0.5 mg scheduled every 4 hours plus prn with 0 prn doses in the past 24 hours, Glycopyrrolate IV is available for secretions, Haloperidol is scheduled every 4 hours (2 doses held per nurse on 6/29 as not felt to be needed) and prn for delirium, and Lorazepam is available for anxiety. Data from [facility] reviewed 6/30/2022:  CT Neck 6/11/22:  1.  Asymmetric enlargement the left parotid gland, consider parotitis. 2.  Infiltrative changes superficial and deep to the distal muscle and in the facial subcutaneous soft tissues on the left may be due to cellulitis. 3.  Area of hypoattenuation in the left auricular soft tissues measuring 0.7 cm without loculation. May be due to phlegmon. CT-scan of the brain 6/11/22:  1.  No acute intracranial bleed, midline shift, mass effect or extra-axial collection. 2.  Loss of volume in the cerebrum and cerebellum with chronic microvascular changes.     3.  Limited views of the left parotid gland with asymmetric enlargement and some infiltrative changes of the imaged portions of the left facial subcutaneous soft tissues. May be due to parotitis. 4.  Left mastoid airspace disease. CT Chest 6/11/22:  1.  Dependent consolidative changes and few parenchymal bands in the lungs may reflect atelectasis. 2.  Endotracheal tube terminates above the bari. Orogastric tube terminates in the stomach lumen. 3.  Nonspecific fluid-filled segments of small bowel and large bowel without dilatation could be due to diarrhea or a mild enterocolitis. 4.  Ancillary findings discussed above. Transthoracic Echocardiogram 6/13/22:  1. Left ventricle: The cavity size was at the lower limits of      normal. There was mild concentric hypertrophy. Systolic function      was hyperdynamic. The estimated ejection fraction was in the      range of 65% to 70%. Wall motion was normal; there were no      regional wall motion abnormalities. 2. Aortic valve: A bioprosthesis (#23 pericardial bioprosthetic      valve implanted December 2013) was present. 3. Mitral valve: The annulus was moderately to markedly calcified.      The leaflets were moderately thickened.    4. Left atrium: The atrium was moderately dilated. 5. Right ventricle: The cavity size was normal. Wall thickness was      normal. Systolic function was normal.   6. Right atrium: The atrium was moderately dilated. 7. Pulmonary arteries: Systolic pressure was mildly increased.      Estimated PA peak pressure is 43mm Hg (S). 8. Pericardium, extracardiac: There was no pericardial effusion. Impressions:  Suboptimal exam for excluding vegetations. Valve   structures are not well visualized. Consider CHITO if clinically   indicated. Physical Exam:   BP (!) 170/70   Pulse 100   Temp 99 °F (37.2 °C) (Axillary)   Resp 20   Ht 6' 2\" (1.88 m)   Wt 189 lb 4.8 oz (85.9 kg)   SpO2 94%   BMI 24.30 kg/m²   General: unresponsive, moaning with exam but peaceful if undisturbed, mild upper airway noise  Skin: sallow, scattered bruising  HEENT: Mucous membranes are dry, open mouth breathing,  Chest: healed sternotomy scar   Heart: distant tones, tachycardic IRRR, S1S2, soft II/VI KIKI at left sternal border  Lungs:  Distant coarse breath sounds bilaterally, diminished at the bases, without rales, scattered rhonchi   Abdomen: soft, bowel sounds hypoactive, no apparent tenderness, nondistended  Extremities:  + mottling, feet are cool, trace edema in bilateral lower extremities,   Neurologic: unresponsive, soft moan with exam,     Assessment/Plan:     1. Sepsis, MSSA bacteremia from left parotitis with septic shock with respiratory failure and removal of mechanical ventilation 6/17, and vasopressors 6/24/22. The patient did not improve with aggressive medical care. [address] for management of pain, dyspnea, delirium, probable end of life care. PPS 20% and prognosis is grim. The comfort medications have been beneficial in managing his symptoms of pain and agitated delirium. Continue scheduled Hydromorphone every 4 hours and continue prn. Continue scheduled Haloperidol every 4 hours and continue prn. Continue scheduled and prn Lorazepam. The patient requires parenteral comfort medications as alternate route is not available. The patient continues with daily decline, and life expectancy is likely days. The patient requires ongoing [address] [place] [address] for frequent assessment of pain, delirium and symptom management with daily physician visits for medication adjustment. 2. History of bioprosthetic aortic valve and prior echocardiogram did not suggest endocarditis  3. Atrial fibrillation  4. DNR-comfort care      Patient Active Problem List   Diagnosis Code    Sepsis ([facility] A41.9    Acute and chronic respiratory failure, unspecified whether with hypoxia or hypercapnia ([facility] J96.20    Hospice care Z51.5       STELLA Galeas MD  6/30/2022

## 2022-07-01 NOTE — PROGRESS NOTES
[address] [place] [address]  General Inpatient Hospice Progress Note    Date: 7/1/2022  Name: Carloz Caicedo. MRN: [id number]  YOB: 1928   Patient's PCP: No primary care provider on file. Acute care admission at [facility]: 6/11 to 6/25/22  Mechanical ventilation: 6/11 to 6/15 and emergently re intubated and extubated 6/17/22  Admit Date: 6/25/2022 to General Inpatient Hospice    Subjective: The patient remains unresponsive, eyes are closed, and he moans with my exam which quickly resolves. There is mild upper airway noise. He is chronically ill appearing. There are no family here. I collaborated with the patient's nurse and the hospice nurse. Case management note from 6/30/22 reviewed regarding court appointed guardian from [facility]. I have reviewed the [facility] records from [facility] on 6/27/22. Objective:   Background: 81 yo male with history of dementia, bioprosthetic aortic valve, atrial fibrillation, hypertension, who was transferred to [place] [address] from [facility] in [place] for end of life care. The patient was admitted at [facility] from an ECF with sepsis, suspected left parotitis, E.coli UTI, developed respiratory failure and was intubated and ventilated, + MSSA bacteremia. He was doing very poorly and he was extubated 6/17/2022 with very poor prognosis. He was on vasopressors which have been stopped 6/24 after goals of care meeting with family relating poor prognosis. His family reports declining health in the past several months. Family requested hospice care and transfer to [facility] to be closer to home. The patient has a history of chronic mastoiditis. he patient's primary caregiver is a granddaughter, Lesli Salinas.       Pain is managed with Hydromorphone IV 0.5 mg scheduled every 4 hours plus prn with 0 prn doses in the past 24 hours, Glycopyrrolate IV is available for secretions, Haloperidol is scheduled every 4 hours and prn for delirium, and Lorazepam is scheduled and prn for anxiety. Data from [facility] reviewed 7/1/2022:  CT Neck 6/11/22:  1.  Asymmetric enlargement the left parotid gland, consider parotitis. 2.  Infiltrative changes superficial and deep to the distal muscle and in the facial subcutaneous soft tissues on the left may be due to cellulitis. 3.  Area of hypoattenuation in the left auricular soft tissues measuring 0.7 cm without loculation. May be due to phlegmon. CT-scan of the brain 6/11/22:  1.  No acute intracranial bleed, midline shift, mass effect or extra-axial collection. 2.  Loss of volume in the cerebrum and cerebellum with chronic microvascular changes.     3.  Limited views of the left parotid gland with asymmetric enlargement and some infiltrative changes of the imaged portions of the left facial subcutaneous soft tissues. May be due to parotitis. 4.  Left mastoid airspace disease. CT Chest 6/11/22:  1.  Dependent consolidative changes and few parenchymal bands in the lungs may reflect atelectasis. 2.  Endotracheal tube terminates above the bari. Orogastric tube terminates in the stomach lumen. 3.  Nonspecific fluid-filled segments of small bowel and large bowel without dilatation could be due to diarrhea or a mild enterocolitis. 4.  Ancillary findings discussed above. Transthoracic Echocardiogram 6/13/22:  1. Left ventricle: The cavity size was at the lower limits of      normal. There was mild concentric hypertrophy. Systolic function      was hyperdynamic. The estimated ejection fraction was in the      range of 65% to 70%. Wall motion was normal; there were no      regional wall motion abnormalities. 2. Aortic valve: A bioprosthesis (#23 pericardial bioprosthetic      valve implanted December 2013) was present. 3. Mitral valve:  The annulus was moderately to markedly calcified.      The leaflets were moderately thickened. 4. Left atrium: The atrium was moderately dilated. 5. Right ventricle: The cavity size was normal. Wall thickness was      normal. Systolic function was normal.   6. Right atrium: The atrium was moderately dilated. 7. Pulmonary arteries: Systolic pressure was mildly increased.      Estimated PA peak pressure is 43mm Hg (S). 8. Pericardium, extracardiac: There was no pericardial effusion. Impressions:  Suboptimal exam for excluding vegetations. Valve   structures are not well visualized. Consider CHITO if clinically   indicated. Physical Exam:   /84   Pulse 59   Temp 97.9 °F (36.6 °C) (Axillary)   Resp 22   Ht 6' 2\" (1.88 m)   Wt 189 lb 4.8 oz (85.9 kg)   SpO2 90%   BMI 24.30 kg/m²   General: unresponsive, less moaning this morning and peaceful if undisturbed, mild upper airway noise  Skin: sallow, scattered bruising  HEENT: Mucous membranes are dry, open mouth breathing,  Chest: healed sternotomy scar   Heart: distant tones, IRRR, S1S2, soft II/VI KIKI at left sternal border  Lungs:  Distant coarse breath sounds bilaterally, diminished at the bases, without rales, scattered rhonchi   Abdomen: soft, bowel sounds hypoactive, no apparent tenderness, nondistended  Extremities:  + mottling, feet are cool, trace edema in bilateral lower extremities,   Neurologic: unresponsive, soft moan with exam,     Assessment/Plan:     1. Sepsis, MSSA bacteremia from left parotitis with septic shock with respiratory failure and removal of mechanical ventilation 6/17, and vasopressors 6/24/22. The patient did not improve with aggressive medical care. [address] for management of pain, dyspnea, delirium, probable end of life care. PPS 20% and prognosis is grim. The comfort medications have been beneficial in managing his symptoms of pain and agitated delirium. Continue scheduled Hydromorphone every 4 hours and continue prn.  Continue scheduled Haloperidol every 4 hours and continue prn. Continue scheduled and prn Lorazepam. The patient requires parenteral comfort medications as alternate route is not available. The patient continues with daily decline, and life expectancy is likely days. The patient requires ongoing [address] [place] [address] for frequent assessment of pain, delirium and symptom management with daily physician visits for medication adjustment as needed. 2. History of bioprosthetic aortic valve and prior echocardiogram did not suggest endocarditis  3. Atrial fibrillation  4. DNR-comfort care      Patient Active Problem List   Diagnosis Code    Sepsis ([facility] A41.9    Acute and chronic respiratory failure, unspecified whether with hypoxia or hypercapnia ([facility] J96.20    Hospice care Z51.5       STELLA High MD  7/1/2022

## 2022-07-02 NOTE — PROGRESS NOTES
Pt's grand daughter notified at [phone] concerning pt's expiration. Pt's grand daughter Shanta Howards to bag pt because she will not be able to come.

## 2022-07-02 NOTE — DISCHARGE SUMMARY
[address] [place] [address]    Death Summary    Date: 7/2/2022  Name: Osmel Brady. MRN: [id number]  YOB: 1928     Patient's PCP: No primary care provider on file. Acute care admission at [facility]: 6/11 to 6/25/22  Mechanical ventilation: 6/11 to 6/15 and emergently re intubated and extubated 6/17/22  Admit Date: 6/25/2022 to [address] [place] [address]  Date of Death: 7/2/2022  Time: 0600  Admitting Physician: Dr. Scarlett Villegas to [address] [place] [address]  Discharge Physician: Freida Carter MD  Consultation: none during General Inpatient Hospice stay    Invasive procedures: none during General Inpatient Hospice stay    Discharge Diagnoses:  1. Sepsis, MSSA bacteremia from left parotitis with septic shock with respiratory failure and removal of mechanical ventilation 6/17, and vasopressors 6/24/22. The patient did not improve with aggressive medical care and admitted to [address] [place] [address]. 2. History of bioprosthetic aortic valve and prior echocardiogram did not suggest endocarditis  3. Atrial fibrillation  4. DNR-comfort care      Patient Active Problem List   Diagnosis Code    Sepsis ([facility] A41.9    Acute and chronic respiratory failure, unspecified whether with hypoxia or hypercapnia ([facility] J96.20    Hospice care Z51.5       Brief History, reason for admission: 81 yo male with history of dementia, bioprosthetic aortic valve, atrial fibrillation, hypertension, who was transferred to [place] [address] from [facility] in [place] for end of life care. The patient was admitted at [facility] from an ECF with sepsis, suspected left parotitis, E.coli UTI, developed respiratory failure and was intubated and ventilated, + MSSA bacteremia. He was doing very poorly and he was extubated 6/17/2022 with very poor prognosis.  He was on vasopressors which have been stopped 6/24 after goals of care meeting with family relating poor prognosis. His family reports declining health in the past several months. Family requested hospice care and transfer to [facility] to be closer to home. The patient has a history of chronic mastoiditis. he patient's primary caregiver is a granddaughter, Luz Bynum. Hospital Course: The patient was admitted to [facility] with the above, for complete details, please see the History and Physical, progress notes, and [facility]. The patient had pain, moaning and terminal delirium, and was treated with comfort medications, and symptoms were managed. Emotional and spiritual support was provided to the patient and family. The patient  as noted above.     Cause of death: sepsis with MSSA bacteremia from left parotitis    Significant Diagnostic Studies:  See computerized record in Shannon Carrillo MD  2022